# Patient Record
Sex: FEMALE | URBAN - METROPOLITAN AREA
[De-identification: names, ages, dates, MRNs, and addresses within clinical notes are randomized per-mention and may not be internally consistent; named-entity substitution may affect disease eponyms.]

---

## 2018-11-28 ENCOUNTER — APPOINTMENT (RX ONLY)
Dept: URBAN - METROPOLITAN AREA CLINIC 103 | Facility: CLINIC | Age: 28
Setting detail: DERMATOLOGY
End: 2018-11-28

## 2018-11-28 DIAGNOSIS — B08.1 MOLLUSCUM CONTAGIOSUM: ICD-10-CM

## 2018-11-28 PROCEDURE — ? LIQUID NITROGEN (SELF-PAY)

## 2018-11-28 PROCEDURE — ? COUNSELING

## 2018-11-28 ASSESSMENT — LOCATION DETAILED DESCRIPTION DERM
LOCATION DETAILED: LEFT PROXIMAL POSTERIOR THIGH
LOCATION DETAILED: LEFT PROXIMAL POSTERIOR THIGH
LOCATION DETAILED: RIGHT PROXIMAL POSTERIOR THIGH
LOCATION DETAILED: RIGHT PROXIMAL POSTERIOR THIGH

## 2018-11-28 ASSESSMENT — LOCATION ZONE DERM
LOCATION ZONE: LEG
LOCATION ZONE: LEG

## 2018-11-28 ASSESSMENT — LOCATION SIMPLE DESCRIPTION DERM
LOCATION SIMPLE: RIGHT POSTERIOR THIGH
LOCATION SIMPLE: LEFT POSTERIOR THIGH
LOCATION SIMPLE: RIGHT POSTERIOR THIGH
LOCATION SIMPLE: LEFT POSTERIOR THIGH

## 2018-11-28 NOTE — PROCEDURE: LIQUID NITROGEN (SELF-PAY)
Consent: The patient's consent was obtained including but not limited to risks of crusting, scabbing, blistering, scarring, darker or lighter pigmentary change, recurrence, incomplete removal and infection. The patient understands that the procedure is cosmetic in nature and is not covered by insurance. Patient paid $50 for cryotherapy.
Detail Level: Detailed
Number Of Freeze-Thaw Cycles: 1 freeze-thaw cycle
Duration Of Freeze Thaw-Cycle (Seconds): 5
Price (Use Numbers Only, No Special Characters Or $): 50.00
Post-Care Instructions: I reviewed with the patient in detail post-care instructions. Patient is to wear sunprotection, and avoid picking at any of the treated lesions. Pt may apply Vaseline to crusted or scabbing areas.
Render Post-Care Instructions In Note?: no

## 2018-11-28 NOTE — PROCEDURE: MIPS QUALITY
Quality 130: Documentation Of Current Medications In The Medical Record: Current Medications Documented
Quality 431: Preventive Care And Screening: Unhealthy Alcohol Use - Screening: Patient screened for unhealthy alcohol use using a single question and scores less than 2 times per year
Detail Level: Detailed
Quality 226: Preventive Care And Screening: Tobacco Use: Screening And Cessation Intervention: Tobacco Screening not Performed for Medical Reasons

## 2019-01-14 ENCOUNTER — APPOINTMENT (RX ONLY)
Dept: URBAN - METROPOLITAN AREA CLINIC 103 | Facility: CLINIC | Age: 29
Setting detail: DERMATOLOGY
End: 2019-01-14

## 2019-01-14 DIAGNOSIS — B08.1 MOLLUSCUM CONTAGIOSUM: ICD-10-CM

## 2019-01-14 DIAGNOSIS — L90.5 SCAR CONDITIONS AND FIBROSIS OF SKIN: ICD-10-CM

## 2019-01-14 PROCEDURE — ? PRODUCT LINE (OFFICE PRODUCTS)

## 2019-01-14 PROCEDURE — ? LIQUID NITROGEN (SELF-PAY)

## 2019-01-14 PROCEDURE — ? COUNSELING

## 2019-01-14 ASSESSMENT — LOCATION ZONE DERM
LOCATION ZONE: TRUNK
LOCATION ZONE: TRUNK

## 2019-01-14 ASSESSMENT — LOCATION SIMPLE DESCRIPTION DERM
LOCATION SIMPLE: BUTTOCK
LOCATION SIMPLE: BUTTOCK

## 2019-01-14 ASSESSMENT — LOCATION DETAILED DESCRIPTION DERM
LOCATION DETAILED: LEFT BUTTOCK
LOCATION DETAILED: RIGHT BUTTOCK
LOCATION DETAILED: LEFT BUTTOCK
LOCATION DETAILED: RIGHT BUTTOCK

## 2019-01-14 NOTE — PROCEDURE: PRODUCT LINE (OFFICE PRODUCTS)
Product 43 Price (In Dollars - Numeric Only, No Special Characters Or $): 0.00
Product 62 Units: 0
Product 2 Application Directions: AAA BID
Name Of Product 7: Moisture Therapy
Name Of Product 9: Latisse 0.03% 5mL
Name Of Product 5: Royal Navarro
Name Of Product 3: Moisture Rich
Product 9 Price (In Dollars - Numeric Only, No Special Characters Or $): 190.00
Name Of Product 11: Daily HA Cleanser
Product 7 Price (In Dollars - Numeric Only, No Special Characters Or $): 37.57
Name Of Product 1: Retinol 1
Product 5 Price (In Dollars - Numeric Only, No Special Characters Or $): 43.53
Product 3 Price (In Dollars - Numeric Only, No Special Characters Or $): 77.87
Product 11 Price (In Dollars - Numeric Only, No Special Characters Or $): 49.93
Product 1 Price (In Dollars - Numeric Only, No Special Characters Or $): 55.53
Product 3 Application Directions: BID
Product 5 Units: 1
Product 9 Application Directions: [facility]
Product 1 Application Directions: AAA face QHS, wash off in AM
Name Of Product 10: Lacti Foam
Allow Plan To Count Towards E/M Coding: Yes
Name Of Product 6: Daily HA pads
Name Of Product 8: Retinol . 5
Detail Level: Zone
Name Of Product 2: Lillian Burns
Name Of Product 4: Lacti creamy
Product 6 Price (In Dollars - Numeric Only, No Special Characters Or $): 47.50
Send Charges To Patient Encounter: No
Product 2 Price (In Dollars - Numeric Only, No Special Characters Or $): 33.17
Product 4 Price (In Dollars - Numeric Only, No Special Characters Or $): 49.75

## 2019-01-14 NOTE — PROCEDURE: LIQUID NITROGEN (SELF-PAY)
Duration Of Freeze Thaw-Cycle (Seconds): 5
Price (Use Numbers Only, No Special Characters Or $): [facility]
Post-Care Instructions: I reviewed with the patient in detail post-care instructions. Patient is to wear sunprotection, and avoid picking at any of the treated lesions. Pt may apply Vaseline to crusted or scabbing areas.
Detail Level: Detailed
Consent: The patient's consent was obtained including but not limited to risks of crusting, scabbing, blistering, scarring, darker or lighter pigmentary change, recurrence, incomplete removal and infection. The patient understands that the procedure is cosmetic in nature and is not covered by insurance. Patient paid $20 for cryotherapy.
Render Post-Care Instructions In Note?: no
Number Of Freeze-Thaw Cycles: 2 freeze-thaw cycles